# Patient Record
Sex: FEMALE | Race: WHITE
[De-identification: names, ages, dates, MRNs, and addresses within clinical notes are randomized per-mention and may not be internally consistent; named-entity substitution may affect disease eponyms.]

---

## 2017-01-04 ENCOUNTER — HOSPITAL ENCOUNTER (EMERGENCY)
Age: 37
Discharge: HOME | End: 2017-01-04
Payer: COMMERCIAL

## 2017-01-04 DIAGNOSIS — S92.332A: Primary | ICD-10-CM

## 2017-01-04 DIAGNOSIS — E11.9: ICD-10-CM

## 2017-01-04 DIAGNOSIS — M19.90: ICD-10-CM

## 2017-01-04 DIAGNOSIS — Z79.84: ICD-10-CM
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
## 2017-01-19 ENCOUNTER — HOSPITAL ENCOUNTER (OUTPATIENT)
Age: 37
Discharge: HOME | End: 2017-01-19
Payer: COMMERCIAL

## 2017-01-19 DIAGNOSIS — M81.0: Primary | ICD-10-CM

## 2017-01-19 DIAGNOSIS — E11.9: ICD-10-CM

## 2018-02-26 ENCOUNTER — HOSPITAL ENCOUNTER (EMERGENCY)
Dept: HOSPITAL 76 - ED | Age: 38
Discharge: HOME | End: 2018-02-26
Payer: COMMERCIAL

## 2018-02-26 VITALS — SYSTOLIC BLOOD PRESSURE: 109 MMHG | DIASTOLIC BLOOD PRESSURE: 84 MMHG

## 2018-02-26 DIAGNOSIS — S63.501A: Primary | ICD-10-CM

## 2018-02-26 DIAGNOSIS — G40.909: ICD-10-CM
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
## 2018-02-26 DIAGNOSIS — E11.9: ICD-10-CM

## 2018-02-26 PROCEDURE — 73090 X-RAY EXAM OF FOREARM: CPT

## 2018-02-26 PROCEDURE — 73110 X-RAY EXAM OF WRIST: CPT

## 2018-02-26 PROCEDURE — 99283 EMERGENCY DEPT VISIT LOW MDM: CPT

## 2018-02-26 NOTE — XRAY PRELIMINARY REPORT
Accession: [id number]

Exam: XR FOREARM RT

 

IMPRESSION: 

1. No definite acute abnormality of the right forearm. 

2. Radial bowing with area of focal radial diaphyseal cortical thickening may represent sequelae of a
n old, healed fracture, versus a developmental finding. 

3. Chronic-type changes of the wrist. Refer to the wrist report from today.

 

[facility]

 

SITE ID: [id number]

## 2018-02-26 NOTE — XRAY REPORT
EXAM:

RIGHT FOREARM RADIOGRAPHY

 

EXAM DATE: 2/26/2018 11:03 AM.

 

CLINICAL HISTORY: Pain.

 

COMPARISON: None.

 

TECHNIQUE: 2 views.

 

FINDINGS: 

Bones/joints: No definite fractures or acute bone lesions. Mild apex-lateral bowing of the radial abhi
physis with some chronic appearing cortical thickening along the medial aspect of the radial diaphysi
s, proximal to the midpoint. These changes could be due to old, healed fracture or could be developme
ntal in nature. Chronic-type changes of the wrist demonstrated. Refer to the wrist report from same d
irma for further information. The elbow process normally aligned. No definite joint effusion.

 

Joints: Normal. No effusions or subluxations in the visualized wrist or elbow joints.

 

Soft Tissues: Normal. No soft tissue swelling.

 

IMPRESSION: 

1. No definite acute abnormality of the right forearm. 

2. Radial bowing with area of focal radial diaphyseal cortical thickening may represent sequelae of a
n old, healed fracture, versus a developmental finding. 

3. Chronic-type changes of the wrist. Refer to the wrist report from today.

 

[facility]

Referring Provider Line: [phone]

 

SITE ID: [id number]

## 2018-02-26 NOTE — XRAY REPORT
EXAM:

RIGHT WRIST RADIOGRAPHY

 

EXAM DATE: 2/26/2018 11:02 AM.

 

CLINICAL HISTORY: Pain.

 

COMPARISON: None.

 

TECHNIQUE: 3 views.

 

FINDINGS: 

Bones/joints: No definite acute fractures or acute bone lesions. There is focal deformity along the m
edial aspect of the distal radius, with some mild marginal spurring. This could represent sequelae of
 remote injury/fracture or developmental in nature. There is radiocarpal joint space narrowing with t
he lunate mildly interposed between the distal radius and ulna.

 

Substantial joint space narrowing with associated marginal broadening/hypertrophy dorsally at the lev
el of carpometacarpal joints, likely the third and/or the second. This also could be a developmental 
finding and possibly a degree of coalition, versus substantial degenerative disease.

 

Soft Tissues: Possible dorsal soft tissue swelling.

 

IMPRESSION: 

1. No definite acute osseous abnormality. 

2. Mild deformity of the distal radius with associated radiocarpal joint space narrowing which may be
 due to remote injury and superimposed degenerative change or may be developmental in nature. 

3. Substantial narrowing and associated chronic hypertrophic changes at some carpometacarpal joints m
ay also be developmental versus advanced degenerative disease.

 

Correlation with clinical examination in history is recommended. If further evaluation were indicated
, MR imaging could be performed.

 

[facility]

Referring Provider Line: [phone]

 

SITE ID: [id number]

## 2018-02-26 NOTE — XRAY PRELIMINARY REPORT
Accession: [id number]

Exam: XR WRIST 3 VIEW RT

 

IMPRESSION: 

1. No definite acute osseous abnormality. 

2. Mild deformity of the distal radius with associated radiocarpal joint space narrowing which may be
 due to remote injury and superimposed degenerative change or may be developmental in nature. 

3. Substantial narrowing and associated chronic hypertrophic changes at some carpometacarpal joints m
ay also be developmental versus advanced degenerative disease.

 

Correlation with clinical examination in history is recommended. If further evaluation were indicated
, MR imaging could be performed.

 

[facility]

 

SITE ID: [id number]

## 2018-02-26 NOTE — ED PHYSICIAN DOCUMENTATION
PD HPI UPPER EXT INJURY





- Stated complaint


Stated Complaint: LEFT HAND INJ/GLF





- Chief complaint


Chief Complaint: Trauma Ext





- History obtained from


History obtained from: Patient





- History of Present Illness


Location: Left


Type of injury: Fall


Timing - details: Abrupt onset, Still present


Worsened by: Moving, Palpating


Associated symptoms: Swelling.  No: Weakness, Numbness


Contributing factors: No: Anticoagulated


Similar symptoms before: Has not had sx before


Recently seen: Not recently seen





Review of Systems


Skin: denies: Abrasion (s), Laceration (s)


Neurologic: denies: Focal weakness, Numbness





PD PAST MEDICAL HISTORY





- Past Medical History


Past Medical History: Yes


Neuro: Seizure disorder


Endocrine/Autoimmune: Type 2 diabetes


HEENT: Chronic hearing loss


Musculoskeletal: Osteopenia


Other Past Medical History: Turners syndrome





- Past Surgical History


Past Surgical History: Yes


HEENT: Myringotomy (tubes)





- Present Medications


Home Medications: 


 Ambulatory Orders











 Medication  Instructions  Recorded  Confirmed


 


Sitagliptin Phos/Metformin HCl 1 each PO BID 02/24/15 02/26/18





[Janumet 50-1,000 mg Tablet]   


 


carBAMazepine ER [TEGretol XR] 200 mg PO DAILY 02/24/15 02/26/18


 


lamoTRIgine [LaMICtal] 100 mg PO BID 02/24/15 02/26/18


 


LORazepam [Ativan] 1 mg PO DAILY PRN 12/26/15 02/26/18


 


Estradiol [Estrace] 1 tab PO DAILY 02/26/18 02/26/18


 


Estrogen,Con/M-Progest Acet 1 tab PO DAILY 02/26/18 02/26/18





[Prempro 0.625-5 mg Tablet]   


 


Glimepiride [Amaryl] 1 tab PO DAILY 02/26/18 02/26/18














- Allergies


Allergies/Adverse Reactions: 


 Allergies











Allergy/AdvReac Type Severity Reaction Status Date / Time


 


levetiracetam [From Keppra] Allergy  Unknown Verified 02/26/18 11:36


 


phenytoin sodium * AdvReac  Nausea Verified 02/26/18 11:36





[From Dilantin]     


 


phenytoin sodium extended * AdvReac  Nausea Verified 02/26/18 11:36





[From Dilantin]     














- Social History


Does the pt smoke?: No


Smoking Status: Never smoker


Does the pt drink ETOH?: No


Does the pt have substance abuse?: No





- Immunizations


Immunizations are current?: Yes





PD ED PE NORMAL





- Vitals


Vital signs reviewed: Yes





- General


General: Alert and oriented X 3, No acute distress, Well developed/nourished





- Derm


Derm: Normal color, Warm and dry





- Extremities


Extremities: Normal ROM s pain, No edema, Other (rigt wrist with some 

tenderness dorsally. Not in snuffbox. Both wrists area placed anteriorly and 

offset a bit developmentally. Only tender on left. Comparison of wrists 

visually are symmetric. )





Results





- Vitals


Vitals: 


 Oxygen











O2 Source                      Room air

















- Rads (name of study)


  ** right wrist


Radiology: Prelim report reviewed, EMP read contemporaneously (findings c/w her 

developmental deformity described by parents (associated with Turners syndrome)

. No noted fractures. )





PD MEDICAL DECISION MAKING





- ED course


Complexity details: reviewed results, considered differential, d/w patient





Departure





- Departure


Disposition: 01 Home, Self Care


Clinical Impression: 


Fall from slip, trip, or stumble


Qualifiers:


 Encounter type: initial encounter Qualified Code(s): W01.0XXA - Fall on same 

level from slipping, tripping and stumbling without subsequent striking against 

object, initial encounter





Right wrist sprain


Qualifiers:


 Encounter type: initial encounter Qualified Code(s): S63.501A - Unspecified 

sprain of right wrist, initial encounter





Condition: Stable


Record reviewed to determine appropriate education?: Yes


Instructions:  ED Sprain Wrist


Follow-Up: 


JEISON REILLY MD [Primary Care Provider] - 


Comments: 


Tylenol or ibuprofen if needed for pains.  Wrist splint as needed for comfort 

and you can reduce use of the splint as it feels more comfortable.  This likely 

will be sore for several days to week or so.  Recheck if not better in that 

time.


Discharge Date/Time: 02/26/18 12:37

## 2018-07-02 ENCOUNTER — HOSPITAL ENCOUNTER (EMERGENCY)
Dept: HOSPITAL 76 - ED | Age: 38
Discharge: HOME | End: 2018-07-02
Payer: COMMERCIAL

## 2018-07-02 VITALS — SYSTOLIC BLOOD PRESSURE: 116 MMHG | DIASTOLIC BLOOD PRESSURE: 75 MMHG

## 2018-07-02 DIAGNOSIS — M85.80: ICD-10-CM

## 2018-07-02 DIAGNOSIS — S82.891A: Primary | ICD-10-CM

## 2018-07-02 DIAGNOSIS — S99.191A: ICD-10-CM
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
## 2018-07-02 DIAGNOSIS — E11.9: ICD-10-CM

## 2018-07-02 PROCEDURE — 73610 X-RAY EXAM OF ANKLE: CPT

## 2018-07-02 PROCEDURE — 29515 APPLICATION SHORT LEG SPLINT: CPT

## 2018-07-02 PROCEDURE — 99283 EMERGENCY DEPT VISIT LOW MDM: CPT

## 2018-07-02 PROCEDURE — 73630 X-RAY EXAM OF FOOT: CPT

## 2018-07-02 NOTE — ED PHYSICIAN DOCUMENTATION
History of Present Illness





- Stated complaint


Stated Complaint: R FOOT INJ





- Chief complaint


Chief Complaint: Heent





- Additonal information


Additional information: 





stumbled on step and twisted R ankle


osteporosis 2/2 early menopause 2/2 underlying pmhx so fx occur easily


no head neck or other injury


otherwise well recently





Review of Systems


: denies: Now pregnant EGA


Musculoskeletal: reports: Pain with weight bearing.  denies: Neck pain


Neurologic: denies: Headache, Head injury





PD PAST MEDICAL HISTORY





- Past Medical History


Past Medical History: Yes


Endocrine/Autoimmune: Type 2 diabetes


HEENT: Chronic hearing loss


Musculoskeletal: Osteopenia





- Past Surgical History


Past Surgical History: Yes


HEENT: Myringotomy (tubes)





- Present Medications


Home Medications: 


 Ambulatory Orders











 Medication  Instructions  Recorded  Confirmed


 


Sitagliptin Phos/Metformin HCl 1 each PO BID 02/24/15 02/26/18





[Janumet 50-1,000 mg Tablet]   


 


carBAMazepine ER [TEGretol XR] 200 mg PO DAILY 02/24/15 02/26/18


 


lamoTRIgine [LaMICtal] 100 mg PO BID 02/24/15 02/26/18


 


LORazepam [Ativan] 1 mg PO DAILY PRN 12/26/15 02/26/18


 


Estradiol [Estrace] 1 tab PO DAILY 02/26/18 02/26/18


 


Estrogen,Con/M-Progest Acet 1 tab PO DAILY 02/26/18 02/26/18





[Prempro 0.625-5 mg Tablet]   


 


Glimepiride [Amaryl] 1 tab PO DAILY 02/26/18 02/26/18














- Allergies


Allergies/Adverse Reactions: 


 Allergies











Allergy/AdvReac Type Severity Reaction Status Date / Time


 


levetiracetam [From Keppra] Allergy  Unknown Verified 07/02/18 16:24


 


phenytoin sodium * AdvReac  Nausea Verified 07/02/18 16:24





[From Dilantin]     


 


phenytoin sodium extended * AdvReac  Nausea Verified 07/02/18 16:24





[From Dilantin]     














- Social History


Does the pt smoke?: No


Smoking Status: Never smoker


Does the pt drink ETOH?: No


Does the pt have substance abuse?: No





- Immunizations


Immunizations are current?: Yes





PD ED PE NORMAL





- Vitals


Vital signs reviewed: Yes





- HEENT


HEENT: Atraumatic





- Cardiac


Cardiac: RRR





- Respiratory


Respiratory: No respiratory distress, Clear bilaterally





- Extremities


Extremities: Other (RLE abrasion and STST lateral mall, mild lateral foot pain, 

no knee pain, MSV intact)





Results





- Vitals


Vitals: 


 Vital Signs - 24 hr











  07/02/18 07/02/18





  16:20 18:40


 


Temperature 36.5 C 


 


Heart Rate 108 H 86


 


Respiratory 18 16





Rate  


 


Blood Pressure 122/86 H 116/75


 


O2 Saturation 96 








 Oxygen











O2 Source                      Room air

















- Rads (name of study)


  ** foot


Radiology: See rad report (prob nondisplaced 5th MT neck fx, old avulsion base 

5th, binio)





  ** ankle


Radiology: See rad report (non displaced horiz lat mall fx, ? tiny medial mall 

avulsion (non TTP there), sequalae prior trauma, STS)





Procedures





- Splint (location)


  ** RLE


Splint applied by: Tech


Type of splint: Fiberglass, Short leg, Posterior


Other: Patient tolerated well, No complications, Neurovascular intact, Other (

pt has knee scooter at home)





PD MEDICAL DECISION MAKING





- ED course


ED course: 





HR better at time of dc





- Sepsis Event


Vital Signs: 


 Vital Signs - 24 hr











  07/02/18 07/02/18





  16:20 18:40


 


Temperature 36.5 C 


 


Heart Rate 108 H 86


 


Respiratory 18 16





Rate  


 


Blood Pressure 122/86 H 116/75


 


O2 Saturation 96 








 Oxygen











O2 Source                      Room air

















Departure





- Departure


Disposition: 01 Home, Self Care


Clinical Impression: 


Ankle fracture, right


Qualifiers:


 Encounter type: initial encounter Fracture type: closed Qualified Code(s): 

S82.891A - Other fracture of right lower leg, initial encounter for closed 

fracture





Marie fracture


Qualifiers:


 Encounter type: initial encounter Fracture type: closed Laterality: right 

Qualified Code(s): S99.191A - Other physeal fracture of right metatarsal, 

initial encounter for closed fracture





Condition: Good


Instructions:  ED Fx Foot, ED Fx Ankle Lateral Malleolus


Follow-Up: 


JEISON REILLY MD [Primary Care Provider] - 


[facility] [Provider Group]


Comments: 


You have fractures of the both the ankle and the foot


The foot fracture is through the neck of the 5th metatarsal and that particular 

spot of that particular bone is prone to slow and incomplete healing.


So close follow up and monitoring by orthopedics is important


No weight bearing - keep the splint on and use your knee scooter


We cleaned the abrasion well and bandaged it before apply the splint but be 

sure the skin is checked again in a few days - if you cant get into orthopedics 

your PMD should be able to remove the splint, check the wound and then reapply 

the same splint.


Tylenol and motrin for pain.


Ice and elevation for swelling


Discharge Date/Time: 07/02/18 18:45

## 2018-07-02 NOTE — XRAY REPORT
Procedure Date:  07/02/2018   

Accession Number:  [id number] / [id number]                    

Procedure:  XR  - Foot 3 View RT CPT Code:  

 

FULL RESULT:

 

 

EXAM:

RIGHT FOOT RADIOGRAPHY

 

EXAM DATE: 7/2/2018 05:03 PM.

 

CLINICAL HISTORY: Fall down stairs.

 

COMPARISON: Right foot radiographs 02/24/2015.

 

TECHNIQUE: 3 views.

 

FINDINGS:

Bones: Questionable fracture in the fifth metatarsal neck. Tiny plantar 

spur. Evidence of prior avulsion fracture at the base of the fifth 

metatarsal with healing.

 

Joints: Halgus valgus with first metatarsophalangeal joint angle 

measuring 27 degrees.

 

Soft Tissues: Normal. No soft tissue swelling.

IMPRESSION:

1. Questionable nondisplaced fracture in the fifth metatarsal neck. 

Correlate with point tenderness.

2. Old avulsion fracture the base of the fifth metatarsal

3. Bunion

 

[facility]

## 2018-07-02 NOTE — XRAY REPORT
Procedure Date:  07/02/2018   

Accession Number:  [id number] / [id number]                    

Procedure:  XR  - Ankle 3 View RT CPT Code:  

 

FULL RESULT:

 

 

EXAM:

RIGHT ANKLE RADIOGRAPHY

 

EXAM DATE: 7/2/2018 05:03 PM.

 

CLINICAL HISTORY: Fall down stairs.

 

COMPARISON: Right ankle radiographs 02/24/2015.

 

TECHNIQUE: 3 views.

 

FINDINGS:

Bones: Horizontal nondisplaced lateral malleolar fracture. Questionable 

tiny avulsion off the medial malleolus. Small ossicles lateral to the 

cuboid may be sequela of prior trauma.

 

Joints: No dislocation. Symmetric mortise.

 

Soft Tissues: Lateral ankle swelling

IMPRESSION:

1. Nondisplaced horizontal lateral malleolar fracture

2. Questionable tiny medial malleolar avulsion fracture

3. Small ossicles lateral to the cuboid may be sequela of prior trauma.

4. Lateral ankle swelling

 

[facility]

## 2021-05-03 ENCOUNTER — HOSPITAL ENCOUNTER (OUTPATIENT)
Dept: HOSPITAL 76 - DI | Age: 41
Discharge: HOME | End: 2021-05-03
Attending: INTERNAL MEDICINE
Payer: COMMERCIAL

## 2021-05-03 DIAGNOSIS — M81.0: Primary | ICD-10-CM

## 2021-05-03 NOTE — DEXA REPORT
PROCEDURE: Dexa Spine and/or Hip

 

INDICATIONS: GO'S SYNDROME

 

TECHNIQUE: Dual energy x-ray absorptiometry (DXA) was performed on a Pictorious System.  Regions measur
ed are the AP Spine, femoral neck, and if needed forearm.

 

COMPARISON: 1/19/2017.

  

FINDINGS:

 

Lumbar Spine: 

Bone Mineral Density   1.023 g/cm/cm,T score  -1.3, there is interval 24.3% increase in total lumbar 
spine bone mineral density.

 

Left Hip:

Bone Mineral Density  0.696 g/cm/cm,T score -2.5, there is interval 4.3% increase in left total hip b
one mineral density since previous study.

 

Left Femoral Neck:

Bone Mineral Density  0.671 g/cm/cm, T score -2.6.

 

Impression: Osteoporosis.

 

Patients with diagnosis of osteoporosis or osteopenia should have regular bone mineral density assess
ment.  For those eligible for Medicare, routine testing is allowed once every 2 years.  Testing frequ
ency can be increased for patients who have rapidly progressing disease or for those who are receivin
g medical therapy to restore bone mass.

 

Reviewed by: Zechariah Lawrence MD on 5/3/2021 3:28 PM PDT

Approved by: Zechariah Lawrence MD on 5/3/2021 3:28 PM PDT

 

 

Station ID:  [id number]

## 2021-05-07 ENCOUNTER — HOSPITAL ENCOUNTER (OUTPATIENT)
Dept: HOSPITAL 76 - DI | Age: 41
Discharge: HOME | End: 2021-05-07
Attending: INTERNAL MEDICINE
Payer: COMMERCIAL

## 2021-05-07 DIAGNOSIS — L98.9: Primary | ICD-10-CM

## 2021-05-10 NOTE — ULTRASOUND REPORT
LIMITED ULTRASOUND OF RIGHT BREAST: 5/7/2021

CLINICAL: Rt breast ulcerations.  

 

Comparison is made to exam dated:  5/7/2021 mammogram - [facility].  

Color flow and real-time ultrasound of the right breast 1-2 o'clock and 11 o'clock regions were perfo
rmed.  Gray scale images of the real-time examination were reviewed.  

 

No significant abnormalities were seen sonographically in the right breast.  

 

IMPRESSION: NEGATIVE 

There is no sonographic evidence of malignancy.  

There is no abnormality seen in the right breast to correspond with the skin lesions/ulcers in the ri
ght breast, however, recommend clinical follow up for persistent or worsening symptoms, or developmen
t of any clinically suspicious findings.

   

A 1 year screening right mammogram is recommended; however, patient will be returning in approximatel
y 6 months for left breast mammogram to confirm stability of probably benign focal asymmetry describe
d in separate mammography report.

 

Findings and recommendations were conveyed to the patient during today's evaluation.

 

 

This exam was interpreted at Station ID: [id number].  

Electronically Signed By: Corbin Clay M.D.  

aty/:5/7/2021 13:01:13  

 

 

 

Ultrasound BI-RADS: 1 Negative

BI-RADS CATEGORY: (1) - 1

RECOMMENDATION: (ANNUAL)  - Recommend routine annual screening mammography.

[id number]

1 year screening

LATERALITY: (B)

## 2021-05-10 NOTE — ULTRASOUND REPORT
LIMITED ULTRASOUND OF LEFT BREAST: 5/7/2021

CLINICAL: Patient returns today to evaluate a focal asymmetry in the left breast.  

 

Comparison is made to exam dated:  5/7/2021 mammogram - [facility].  

 Real-time ultrasound of the left breast 12-3 o'clock region was performed.  Gray scale images of the
 real-time examination were reviewed.  

 

No significant abnormalities were seen sonographically in the left breast.  

 

IMPRESSION: PROBABLY BENIGN 

There is no abnormality seen in the left breast to correspond with the mammography finding which like
ly represents normal fibroglandular tissue.  This is suspected to represent asymmetric distribution o
f fibroglandular tissue and is probably benign.

A follow-up left mammogram in 6 months is recommended to demonstrate stability.  

 

Findings and recommendations were conveyed to the patient during today's evaluation.

 

 

This exam was interpreted at Station ID: [id number].  

Electronically Signed By: Corbin Clay M.D.  

aty/:5/7/2021 12:52:08  

 

 

 

Ultrasound BI-RADS: 3 Probably benign

BI-RADS CATEGORY: (3) - 3

Mammogram

[id number]

6 month follow-up

LATERALITY: (L)

## 2021-05-10 NOTE — MAMMOGRAPHY REPORT
BILATERAL DIGITAL DIAGNOSTIC MAMMOGRAM 3D/2D: 5/7/2021

CLINICAL: Baseline exam. Pt c/o skin ulcers on right breast.  

 

No prior exams were available for comparison.  The tissue of both breasts is heterogeneously dense. T
his may lower the sensitivity of mammography.  

 

 

There is an irregular equal density focal asymmetry in the left breast at 11 o'clock anterior depth w
hich decreases in conspicuity and size on spot compression views.  

No other significant masses, calcifications, or other findings are seen in either breast.  

 

IMPRESSION: INCOMPLETE: NEEDS ADDITIONAL IMAGING EVALUATION

The irregular equal density focal asymmetry in the left breast resembles fibroglandular tissue and is
 indeterminate.  An ultrasound is recommended for further evaluation and is scheduled to immediately 
follow this examination. 

 

There is no abnormality seen in the right breast to correspond with the area of clinical concern and 
multiple skin lesions/ulcerations, however, ultrasound is recommended to confirm no underlying abnorm
alities in the breast parenchyma.  

 

 

This exam was interpreted at Station ID: [id number].  

 

NOTE: For mammograms, a report in lay terms will be sent to the patient. Approximately 15% of breast 
malignancies will not be visualized mammographically. In the management of a palpable breast mass, a 
negative mammogram must not discourage biopsy of a clinically suspicious lesion.

 

Electronically Signed By: Corbin Clay M.D.          

aty/:5/7/2021 12:50:16  

 

 

 

ACR BI-RADS Category 0: Incomplete [id number]

PARENCHYMAL PATTERN: (D) - The breast(s) demonstrate(s) heterogeneously dense fibroglandular parenchy
ma.

BI-RADS CATEGORY: (0) - 0

Ultrasound

[id number]

Immediate follow-up

LATERALITY: (B)

## 2021-12-09 ENCOUNTER — HOSPITAL ENCOUNTER (OUTPATIENT)
Dept: HOSPITAL 76 - DI | Age: 41
Discharge: HOME | End: 2021-12-09
Attending: INTERNAL MEDICINE
Payer: COMMERCIAL

## 2021-12-09 DIAGNOSIS — R92.8: Primary | ICD-10-CM

## 2021-12-10 NOTE — MAMMOGRAPHY REPORT
UNILATERAL LEFT DIGITAL DIAGNOSTIC MAMMOGRAM 3D/2D: 12/9/2021

CLINICAL: Patient returns for a 6 month follow up of the left breast.  

 

Comparison is made to exams dated:  5/7/2021 ultrasound, 5/7/2021 ultrasound, and 5/7/2021 mammogram 
- [facility].  There are scattered fibroglandular elements in left breast.  

 

There is an asymmetry in the left breast anterior depth medial region seen on the craniocaudal view o
nly.  This is less prominent.  

No other significant masses or calcifications are seen in the breast.  

 

IMPRESSION: BENIGN

The asymmetry in the left breast resembles fibroglandular tissue and is benign.  

There is no mammographic evidence of malignancy. Return to annual mammogram screening schedule is rec
ommended. 

Findings and recommendations were conveyed to the patient at time of exam.  

 

 

This exam was interpreted at Station ID: [id number].  

 

NOTE: For mammograms, a report in lay terms will be sent to the patient. Approximately 15% of breast 
malignancies will not be visualized mammographically. In the management of a palpable breast mass, a 
negative mammogram must not discourage biopsy of a clinically suspicious lesion.

 

Electronically Signed By: Claire torrez/:12/9/2021 10:39:37  

 

 

 

ACR BI-RADS Category 2: Benign Finding(s) 3342F

PARENCHYMAL PATTERN: (A) - The breast(s) demonstrate(s) scattered fibroglandular densities.

BI-RADS CATEGORY: (2) - 2

RECOMMENDATION: (ANNUAL)  - Recommend routine annual screening mammography.

[id number]

return to screening

LATERALITY: (B)